# Patient Record
Sex: MALE | Race: ASIAN | NOT HISPANIC OR LATINO | URBAN - METROPOLITAN AREA
[De-identification: names, ages, dates, MRNs, and addresses within clinical notes are randomized per-mention and may not be internally consistent; named-entity substitution may affect disease eponyms.]

---

## 2019-01-09 ENCOUNTER — OFFICE VISIT (OUTPATIENT)
Dept: OPHTHALMOLOGY | Facility: CLINIC | Age: 64
End: 2019-01-09
Attending: OPHTHALMOLOGY
Payer: COMMERCIAL

## 2019-01-09 DIAGNOSIS — H35.373 EPIRETINAL MEMBRANE (ERM), BILATERAL: ICD-10-CM

## 2019-01-09 DIAGNOSIS — H35.373 EPIRETINAL MEMBRANE (ERM), BILATERAL: Primary | ICD-10-CM

## 2019-01-09 PROCEDURE — 92134 CPTRZ OPH DX IMG PST SGM RTA: CPT | Mod: ZF | Performed by: OPHTHALMOLOGY

## 2019-01-09 PROCEDURE — G0463 HOSPITAL OUTPT CLINIC VISIT: HCPCS | Mod: ZF

## 2019-01-09 ASSESSMENT — SLIT LAMP EXAM - LIDS
COMMENTS: NORMAL
COMMENTS: NORMAL

## 2019-01-09 ASSESSMENT — CONF VISUAL FIELD
OS_NORMAL: 1
METHOD: COUNTING FINGERS
OD_NORMAL: 1

## 2019-01-09 ASSESSMENT — VISUAL ACUITY
CORRECTION_TYPE: GLASSES
OS_PH_CC: 20/40
OS_CC: 20/60
OS_PH_CC+: +2
OD_PH_CC+: -2
METHOD: SNELLEN - LINEAR
OD_CC+: -1
OD_CC: 20/40
OD_PH_CC: 20/30

## 2019-01-09 ASSESSMENT — REFRACTION_WEARINGRX
OS_ADD: +2.00
OD_ADD: +2.00
OS_CYLINDER: +1.25
OS_SPHERE: -7.00
OD_CYLINDER: +1.00
OD_SPHERE: -8.00
OS_AXIS: 056
OD_AXIS: 120

## 2019-01-09 ASSESSMENT — TONOMETRY
IOP_METHOD: TONOPEN
OD_IOP_MMHG: 13
OS_IOP_MMHG: 14

## 2019-01-09 NOTE — NURSING NOTE
Chief Complaints and History of Present Illnesses   Patient presents with     Follow Up     3 year follow up Retinal tear left eye s/p cryotherapy     Chief Complaint(s) and History of Present Illness(es)     Follow Up     Associated symptoms: Negative for redness, dryness and tearing    Comments: 3 year follow up Retinal tear left eye s/p cryotherapy              Comments     Pt states vision more blurry in both eyes over the past 6 months. Pt's JULIUS was yesteday.  Pt notes that he was prescribed new glasses, but the prescription was about the same as his current glasses.  Pt has many questions about cataract surgery.   No eye pain today. Occasional floaters in BE, no changes.    Shraddha BA January 9, 2019 1:29 PM

## 2019-01-09 NOTE — PROGRESS NOTES
Cc: Renato Hollingsworth is a 63-year-old man who is here today s/p cryotherapy for retinal tear left eye.   HPI: He has had no change in vision. Denies flashes/floaters/field defects.      Past ocular history:   high myopia  Epiretinal membrane   Retinal tear left eye s/p cryotherapy       Impression  1. Retinal tear left eye s/p cryotherapy 10/2014   Doing well   Reviewed Retinal detachment precautions    2. High myopia  -- stable       3. Epiretinal membrane both eyes  -- stable  --Amsler precautions    4. Mild cataracts- observe    Follow up 8 months with OCT       Complete documentation of historical and exam elements from today's encounter can be found in the full encounter summary report (not reduplicated in this progress note).  I personally obtained the chief complaint(s) and history of present illness.  I confirmed and edited as necessary the review of systems, past medical/surgical history, family history, social history, and examination findings as documented by others; and I examined the patient myself.  I personally reviewed the relevant tests, images, and reports as documented above.  I formulated and edited as necessary the assessment and plan and discussed the findings and management plan with the patient and family       Gilles Barrow MD PhD  Vitreoretinal Surgery Fellow  [facility]

## 2019-09-10 DIAGNOSIS — H35.373 EPIRETINAL MEMBRANE (ERM), BILATERAL: Primary | ICD-10-CM

## 2019-11-08 ENCOUNTER — HEALTH MAINTENANCE LETTER (OUTPATIENT)
Age: 64
End: 2019-11-08

## 2020-02-23 ENCOUNTER — HEALTH MAINTENANCE LETTER (OUTPATIENT)
Age: 65
End: 2020-02-23

## 2020-12-06 ENCOUNTER — HEALTH MAINTENANCE LETTER (OUTPATIENT)
Age: 65
End: 2020-12-06

## 2021-04-11 ENCOUNTER — HEALTH MAINTENANCE LETTER (OUTPATIENT)
Age: 66
End: 2021-04-11

## 2021-09-25 ENCOUNTER — HEALTH MAINTENANCE LETTER (OUTPATIENT)
Age: 66
End: 2021-09-25

## 2021-10-13 ENCOUNTER — APPOINTMENT (OUTPATIENT)
Dept: URBAN - METROPOLITAN AREA CLINIC 259 | Age: 66
Setting detail: DERMATOLOGY
End: 2021-10-13

## 2021-10-13 DIAGNOSIS — D22 MELANOCYTIC NEVI: ICD-10-CM

## 2021-10-13 DIAGNOSIS — L57.8 OTHER SKIN CHANGES DUE TO CHRONIC EXPOSURE TO NONIONIZING RADIATION: ICD-10-CM

## 2021-10-13 DIAGNOSIS — L82.1 OTHER SEBORRHEIC KERATOSIS: ICD-10-CM

## 2021-10-13 PROBLEM — D48.5 NEOPLASM OF UNCERTAIN BEHAVIOR OF SKIN: Status: ACTIVE | Noted: 2021-10-13

## 2021-10-13 PROCEDURE — OTHER PATHOLOGY BILLING: OTHER

## 2021-10-13 PROCEDURE — OTHER REASSURANCE: OTHER

## 2021-10-13 PROCEDURE — OTHER COUNSELING: OTHER

## 2021-10-13 PROCEDURE — 88305 TISSUE EXAM BY PATHOLOGIST: CPT

## 2021-10-13 PROCEDURE — OTHER SHAVE REMOVAL: OTHER

## 2021-10-13 PROCEDURE — 99203 OFFICE O/P NEW LOW 30 MIN: CPT | Mod: 25

## 2021-10-13 PROCEDURE — 11311 SHAVE SKIN LESION 0.6-1.0 CM: CPT

## 2021-10-13 PROCEDURE — OTHER MIPS QUALITY: OTHER

## 2021-10-13 ASSESSMENT — LOCATION SIMPLE DESCRIPTION DERM
LOCATION SIMPLE: RIGHT LOWER BACK
LOCATION SIMPLE: RIGHT CHEEK
LOCATION SIMPLE: LEFT FOREHEAD
LOCATION SIMPLE: RIGHT THIGH

## 2021-10-13 ASSESSMENT — LOCATION DETAILED DESCRIPTION DERM
LOCATION DETAILED: LEFT SUPERIOR MEDIAL FOREHEAD
LOCATION DETAILED: RIGHT CENTRAL MALAR CHEEK
LOCATION DETAILED: RIGHT ANTERIOR PROXIMAL THIGH
LOCATION DETAILED: RIGHT SUPERIOR LATERAL MIDBACK

## 2021-10-13 ASSESSMENT — LOCATION ZONE DERM
LOCATION ZONE: TRUNK
LOCATION ZONE: FACE
LOCATION ZONE: FACE
LOCATION ZONE: LEG

## 2021-10-13 NOTE — PROCEDURE: MIPS QUALITY
Detail Level: Detailed
Quality 111:Pneumonia Vaccination Status For Older Adults: Pneumococcal Vaccination not Administered or Previously Received, Reason not Otherwise Specified
Quality 226: Preventive Care And Screening: Tobacco Use: Screening And Cessation Intervention: Patient screened for tobacco use and is an ex/non-smoker
Quality 130: Documentation Of Current Medications In The Medical Record: Current Medications Documented
Quality 431: Preventive Care And Screening: Unhealthy Alcohol Use - Screening: Patient screened for unhealthy alcohol use using a single question and scores less than 2 times per year
Quality 110: Preventive Care And Screening: Influenza Immunization: Influenza Immunization Ordered or Recommended, but not Administered due to system reason

## 2021-10-13 NOTE — PROCEDURE: SHAVE REMOVAL
Billing Type: Third-Party Bill
Anesthesia Type: 1% lidocaine with epinephrine
Medical Necessity Clause: This procedure was medically necessary because the lesion that was treated was:
Consent was obtained from the patient. The risks and benefits to therapy were discussed in detail. Specifically, the risks of infection, scarring, bleeding, prolonged wound healing, incomplete removal, allergy to anesthesia, nerve injury and recurrence were addressed. Prior to the procedure, the treatment site was clearly identified and confirmed by the patient. All components of Universal Protocol/PAUSE Rule completed.
Bill 03231 For Specimen Handling/Conveyance To Laboratory?: no
Was A Bandage Applied: Yes
Biopsy Method: Dermablade
Hemostasis: Drysol
X Size Of Lesion In Cm (Optional): 0
Detail Level: Detailed
Post-Care Instructions: I reviewed with the patient in detail post-care instructions. Patient is to keep the biopsy site dry overnight, and then apply bacitracin twice daily until healed. Patient may apply hydrogen peroxide soaks to remove any crusting.
Notification Instructions: Patient will be notified of pathology results. However, patient instructed to call the office if not contacted within 2 weeks.
Medical Necessity Information: It is in your best interest to select a reason for this procedure from the list below. All of these items fulfill various CMS LCD requirements except the new and changing color options.
Size Of Lesion In Cm (Required): 1
Wound Care: Petrolatum

## 2021-10-13 NOTE — PROCEDURE: PATHOLOGY BILLING
Immunohistochemistry (98993 and [id number]) billing is not performed here. Please use the Immunohistochemistry Stain Billing plan to accomplish this. Immunohistochemistry ([id number] and [id number]) billing is not performed here. Please use the Immunohistochemistry Stain Billing plan to accomplish this.

## 2022-05-07 ENCOUNTER — HEALTH MAINTENANCE LETTER (OUTPATIENT)
Age: 67
End: 2022-05-07

## 2023-01-07 ENCOUNTER — HEALTH MAINTENANCE LETTER (OUTPATIENT)
Age: 68
End: 2023-01-07

## 2023-05-16 DIAGNOSIS — H35.373 EPIRETINAL MEMBRANE (ERM) OF BOTH EYES: Primary | ICD-10-CM

## 2023-06-01 ENCOUNTER — OFFICE VISIT (OUTPATIENT)
Dept: OPHTHALMOLOGY | Facility: CLINIC | Age: 68
End: 2023-06-01
Attending: OPHTHALMOLOGY
Payer: COMMERCIAL

## 2023-06-01 DIAGNOSIS — H25.013 CORTICAL SENILE CATARACT OF BOTH EYES: ICD-10-CM

## 2023-06-01 DIAGNOSIS — H35.373 EPIRETINAL MEMBRANE (ERM) OF BOTH EYES: Primary | ICD-10-CM

## 2023-06-01 PROBLEM — B02.9 HERPES ZOSTER WITHOUT COMPLICATION: Status: ACTIVE | Noted: 2020-09-01

## 2023-06-01 PROCEDURE — 99214 OFFICE O/P EST MOD 30 MIN: CPT | Mod: 25 | Performed by: OPHTHALMOLOGY

## 2023-06-01 PROCEDURE — 92015 DETERMINE REFRACTIVE STATE: CPT

## 2023-06-01 PROCEDURE — 99204 OFFICE O/P NEW MOD 45 MIN: CPT | Mod: GC | Performed by: OPHTHALMOLOGY

## 2023-06-01 PROCEDURE — 92134 CPTRZ OPH DX IMG PST SGM RTA: CPT | Performed by: OPHTHALMOLOGY

## 2023-06-01 ASSESSMENT — SLIT LAMP EXAM - LIDS
COMMENTS: NORMAL
COMMENTS: NORMAL

## 2023-06-01 ASSESSMENT — VISUAL ACUITY
METHOD: SNELLEN - LINEAR
CORRECTION_TYPE: GLASSES
OD_PH_CC: 20/50
OS_PH_CC: 20/60
OD_CC+: +1
OS_CC: 20/300
OD_CC: 20/100

## 2023-06-01 ASSESSMENT — TONOMETRY
IOP_METHOD: TONOPEN
OS_IOP_MMHG: 21
OD_IOP_MMHG: 23

## 2023-06-01 ASSESSMENT — REFRACTION_MANIFEST
OS_AXIS: 040
OD_CYLINDER: +1.00
OD_SPHERE: -11.00
OS_SPHERE: -10.75
OS_CYLINDER: +1.25
OS_ADD: +2.75
OD_ADD: +2.75
OD_AXIS: 120

## 2023-06-01 ASSESSMENT — CONF VISUAL FIELD
OS_SUPERIOR_NASAL_RESTRICTION: 0
OS_NORMAL: 1
OD_SUPERIOR_TEMPORAL_RESTRICTION: 0
OS_INFERIOR_NASAL_RESTRICTION: 0
OD_SUPERIOR_NASAL_RESTRICTION: 0
OD_INFERIOR_TEMPORAL_RESTRICTION: 0
OD_NORMAL: 1
OS_INFERIOR_TEMPORAL_RESTRICTION: 0
OS_SUPERIOR_TEMPORAL_RESTRICTION: 0
OD_INFERIOR_NASAL_RESTRICTION: 0
METHOD: COUNTING FINGERS

## 2023-06-01 ASSESSMENT — CUP TO DISC RATIO
OS_RATIO: 0.4
OD_RATIO: 0.4

## 2023-06-01 ASSESSMENT — REFRACTION_WEARINGRX
OS_CYLINDER: +1.25
OD_SPHERE: -7.75
OS_ADD: +2.50
OS_AXIS: 068
OS_SPHERE: -7.50
OD_CYLINDER: +0.75
OD_ADD: +2.50
OD_AXIS: 120

## 2023-06-01 NOTE — NURSING NOTE
Chief Complaints and History of Present Illnesses   Patient presents with     Retinal Evaluation     Chief Complaint(s) and History of Present Illness(es)     Retinal Evaluation            Laterality: both eyes    Onset: years ago    Quality: Blurry computer   Eyes get tired after working on the computer    Associated symptoms: double vision (triple va more when driving.  Closing one eye does not help), photophobia and floaters.  Negative for dryness, redness, tearing and flashes    Treatments tried: no treatments    Pain scale: 0/10          Comments    Here for Epiretinal membrane (ERM), bilateral  Maddy Adhikari COT 7:21 AM June 1, 2023

## 2023-06-01 NOTE — PROGRESS NOTES
CC: Follow-up ERM OU   last eye examination 2015  Interval history: Last visit 1/9/19. Eyes get tired after working on computer. He has diplopia intermittently, including triple vision while driving, closing one eye does not resolve symptoms, they occur in each eye individually. No flashes. Chronic floaters. No eye pain. No eyedrops at home. He notices some metamorphopsia only when viewing with only left eye open.     HPI: Renato Hollingsworth is a 68 year old year-old patient with history of cryotherapy for retinal tear left eye, high myopia, and ERM both eyes.       Past ocular history:   high myopia OU  Epiretinal membrane OU  Retinal tear left eye s/p cryotherapy 10/2014    Retinal Imaging:   OCT Mac June 1, 2023  RE: ERM with disruption of foveal contour, no IRF/SRF, overall stable compared to 2019.   LE: ERM with disruption of foveal contour, no IRF/SRF, evolution from 2019 with slightly more distortion of fovea.      Assessment & Plan:       # Epiretinal membrane both eyes  -- Significant distortion of foveal contour in both eyes. Patient reporting increased monocular double and triple vision. He notes left eye metamorphopsia that resolves in binocular conditions.   -- His symptoms could also be caused by dry eyes and/or cataracts contributing.   -- Discuss possibility of dry eye treatment and cataract surgery first, followed by possibility ERM peel if symptoms persist. Vs considering combo surgery        # Retinal tear left eye s/p cryotherapy 10/2014               Doing well               Review Retinal detachment precautions     # High myopia  -- Myopic shift since last exam likely due to cataract progression.     # Cataracts  -- Likely becoming visually significant in both eyes. Cataract surgery would have the added benefit of lowering IOP.   - Discuss possibility of cataract surgery both eyes, discussing that this may not correct the double/triple vision and metamorphopsia.     # Pterygium, right eye  -  Monitor.  - Artificial tears both eyes.   - Discuss sun protection/sunglasses.     # Ocular hypertension, each eye  - IOP at max value of 23/21 today.   - Not on glaucoma drops.   - No family history of glaucoma.   - Consider cataract surgery as above.   - Consider glaucoma testing if persistent at future visits.     Refractive error  Final Rx given 06/01/23      Sphere Cylinder [place] Dist VA Add Near VA   Right -11.00 +1.00 120 20/40 +2.75 J1   Left -10.75 +1.25 040 20/30 +2.75 J1          PLAN:   Prescription given 06/01/23   Patient will consider Cataract extraction intraocular lens and possibly Epiretinal membrane peel in the next few months.  Follow up in 6 months with pachmetry; retinal nerve fiber layer; Optical Coherence Tomography of the macula  BAT and possible intraocular lens calcs  Karlos Brown MD  Ophthalmology, PGY-3      ~~~~~~~~~~~~~~~~~~~~~~~~~~~~~~~~~~   Complete documentation of historical and exam elements from today's encounter can be found in the full encounter summary report (not reduplicated in this progress note).  I personally obtained the chief complaint(s) and history of present illness.  I confirmed and edited as necessary the review of systems, past medical/surgical history, family history, social history, and examination findings as documented by others; and I examined the patient myself.  I personally reviewed the relevant tests, images, and reports as documented above.  I personally reviewed the ophthalmic test(s) associated with this encounter, agree with the interpretation(s) as documented by the resident/fellow, and have edited the corresponding report(s) as necessary.   I formulated and edited as necessary the assessment and plan and discussed the findings and management plan with the patient and family    Marcella Martínez MD  Professor of Ophthalmology.   Vitreo-retinal surgeon   Department of Ophthalmology and Visual Neurosciences   [facility]  Phone: [phone]   Fax: [phone]

## 2023-06-02 ENCOUNTER — HEALTH MAINTENANCE LETTER (OUTPATIENT)
Age: 68
End: 2023-06-02

## 2023-06-14 ENCOUNTER — IMMUNIZATION (OUTPATIENT)
Dept: NURSING | Facility: CLINIC | Age: 68
End: 2023-06-14
Payer: COMMERCIAL

## 2023-06-14 PROCEDURE — 0124A COVID-19 BIVALENT 12+ (PFIZER): CPT

## 2023-06-14 PROCEDURE — 91312 COVID-19 BIVALENT 12+ (PFIZER): CPT

## 2023-11-03 ENCOUNTER — IMMUNIZATION (OUTPATIENT)
Dept: NURSING | Facility: CLINIC | Age: 68
End: 2023-11-03
Payer: COMMERCIAL

## 2023-11-03 PROCEDURE — 90480 ADMN SARSCOV2 VAC 1/ONLY CMP: CPT

## 2023-11-03 PROCEDURE — 91320 SARSCV2 VAC 30MCG TRS-SUC IM: CPT

## 2023-12-08 DIAGNOSIS — H35.373 EPIRETINAL MEMBRANE (ERM) OF BOTH EYES: ICD-10-CM

## 2023-12-08 DIAGNOSIS — H40.053 OCULAR HYPERTENSION, BILATERAL: Primary | ICD-10-CM

## 2023-12-20 ENCOUNTER — OFFICE VISIT (OUTPATIENT)
Dept: OPHTHALMOLOGY | Facility: CLINIC | Age: 68
End: 2023-12-20
Attending: OPHTHALMOLOGY
Payer: COMMERCIAL

## 2023-12-20 DIAGNOSIS — H35.373 EPIRETINAL MEMBRANE (ERM) OF BOTH EYES: ICD-10-CM

## 2023-12-20 DIAGNOSIS — H40.053 OCULAR HYPERTENSION, BILATERAL: ICD-10-CM

## 2023-12-20 PROCEDURE — 92134 CPTRZ OPH DX IMG PST SGM RTA: CPT | Performed by: OPHTHALMOLOGY

## 2023-12-20 PROCEDURE — 99214 OFFICE O/P EST MOD 30 MIN: CPT | Performed by: OPHTHALMOLOGY

## 2023-12-20 PROCEDURE — 99207 OCT OPTIC NERVE RNFL SPECTRALIS OU (BOTH EYES): CPT | Mod: 26 | Performed by: OPHTHALMOLOGY

## 2023-12-20 PROCEDURE — 99212 OFFICE O/P EST SF 10 MIN: CPT | Performed by: OPHTHALMOLOGY

## 2023-12-20 PROCEDURE — 92133 CPTRZD OPH DX IMG PST SGM ON: CPT | Performed by: OPHTHALMOLOGY

## 2023-12-20 ASSESSMENT — PACHYMETRY
OD_CT(UM): 564
OS_CT(UM): 581

## 2023-12-20 ASSESSMENT — REFRACTION_WEARINGRX
OD_SPHERE: -11.00
OS_AXIS: 040
OD_AXIS: 120
OD_CYLINDER: +1.00
OD_ADD: +2.75
OS_SPHERE: -10.75
OS_CYLINDER: +1.25
OS_ADD: +2.75

## 2023-12-20 ASSESSMENT — CONF VISUAL FIELD
OD_SUPERIOR_NASAL_RESTRICTION: 0
OD_NORMAL: 1
OD_INFERIOR_NASAL_RESTRICTION: 0
OS_SUPERIOR_TEMPORAL_RESTRICTION: 0
OS_INFERIOR_NASAL_RESTRICTION: 0
OD_INFERIOR_TEMPORAL_RESTRICTION: 0
OD_SUPERIOR_TEMPORAL_RESTRICTION: 0
OS_NORMAL: 1
OS_INFERIOR_TEMPORAL_RESTRICTION: 0
OS_SUPERIOR_NASAL_RESTRICTION: 0

## 2023-12-20 ASSESSMENT — VISUAL ACUITY
METHOD: SNELLEN - LINEAR
CORRECTION_TYPE: GLASSES
OS_BAT_MED: 20/60
OD_BAT_HIGH: 20/125
OS_BAT_LOW: 20/60
OS_BAT_HIGH: 20/125
OD_CC: 20/60
OD_BAT_MED: 20/60
OS_CC: 20/60
OD_BAT_LOW: 20/60

## 2023-12-20 ASSESSMENT — TONOMETRY
OS_IOP_MMHG: 18
OD_IOP_MMHG: 14
IOP_METHOD: TONOPEN

## 2023-12-20 ASSESSMENT — CUP TO DISC RATIO
OS_RATIO: 0.4
OD_RATIO: 0.4

## 2023-12-20 ASSESSMENT — SLIT LAMP EXAM - LIDS
COMMENTS: NORMAL
COMMENTS: NORMAL

## 2023-12-20 NOTE — PROGRESS NOTES
CC: Follow-up ERM OU   last eye examination 2015  Interval history: reports the prescription given helped, but now is getting blurry again.  Eyes get tired after working on computer. He has diplopia intermittently, including triple vision while driving, closing one eye does not resolve symptoms, they occur in each eye individually. No flashes. Chronic floaters. No eye pain. No eyedrops at home. He notices some metamorphopsia only when viewing with only left eye open.     HPI: Renato Hollingsworth is a 68 year old year-old patient with history of cryotherapy for retinal tear left eye, high myopia, and ERM both eyes.       Past ocular history:   high myopia OU  Epiretinal membrane OU  Retinal tear left eye s/p cryotherapy 10/2014    Retinal Imaging:   OCT Mac 12/20/23   RE: ERM with disruption of foveal contour, no IRF/SRF, overall stable  LE: ERM with disruption of foveal contour, no IRF/SRF, evolution from 2019 with slightly more distortion of fovea.      Assessment & Plan:    # Epiretinal membrane both eyes  -- Significant distortion of foveal contour in both eyes. Patient reporting increased monocular double and triple vision. He notes left eye metamorphopsia that resolves in binocular conditions.   -- His symptoms could also be caused by dry eyes and/or cataracts contributing.   -- Discuss possibility of dry eye treatment and cataract surgery first, followed by possibility ERM peel if symptoms persist. Vs considering combo surgery      # Retinal tear left eye s/p cryotherapy 10/2014               Doing well               Review Retinal detachment precautions     # Cataracts  -- visually significant in both eyes. Cataract surgery would have the added benefit of lowering IOP.   - Discuss possibility of cataract surgery both eyes, discussing that this may not correct the double/triple vision and metamorphopsia.   # High myopia  -- Myopic shift since last exam likely due to cataract progression.   Glare Testing     Off Low  Medium High   Right 20/60 20/60 20/60 20/125   Left 20/60 20/60 20/60 20/125   Edited by: Cait Owens          Plan for Cataract extraction intraocular lens left eye first  Patient high myopia  Has pterygium right eye     The cataract is visually significant, Reports impacts ADL and has glare     Surgeon procedure time:  45 min  Urgency of Surgery: Routine  Post-op apps needed: 1day; 1 week; 3 weeks  Multi surgeon case: No   H&P completed by primary care physician/PAC   needed: NO  Anesthesia: Peribulbar block    Aim for: -2.0    Dilation:Good  Iris expansion: Not needed  Epinephrine: No  Pseudoexfoliation: NO  Trypan Blue: Poss  Phacodonesis: No  Cornea guttae: rare  Anticoagulants: NO  Flomax: NO  Candidate for multifocal or toric IOL: NO  Visually significant astigmatism: Discussed elective surgical refractive corrective options  Prior refractive surgery: No    I reviewed the indications, risks, benefits, and alternatives of the proposed surgical procedure. We discussed at length cataracts and the effect of the cataracts on vision.   We discussed the fact that modern cataract surgery is usually successful at alleviating symptoms of glare when the cataract is the causative factor. Other risks were discussed with patient including, but not limited to, failure to improve vision or further loss of vision, bleeding, infection, loss of vision and the remote possibility of complications of anesthesia or need for additional surgery. 1:1000 risk of infection/bleed/loss of eye; 1:100 risk of RD and need for further surgery.  Patient agreed to proceed with surgery.  I provided multiple opportunities for the questions, answered all questions to the best of my ability, and confirmed that my answers and my discussion were understood.     Patient not sure when he wants to surgery will return in 4 months      # Pterygium, right eye  - Monitor.  - Artificial tears both eyes.   - Discuss sun protection/sunglasses.      # history of Ocular hypertension, each eye  Pachmetry 563/ 587  - IOP at max value of 23/21 12/20/23 14/18  - Not on glaucoma drops.   - No family history of glaucoma.   - Consider cataract surgery as above.   - retinal nerve fiber layer 12/20/23 nasal thinning both eyes, but optic nerve tilted  Will need OVF baseline    Refractive error  Prescription given 06/01/23     Sphere Cylinder [place] Dist VA Add Near VA   Right -11.00 +1.00 120 20/40 +2.75 J1   Left -10.75 +1.25 040 20/30 +2.75 J1          PLAN:  follow up in 4 months with prescription; intraocular lens calcs; topography      ~~~~~~~~~~~~~~~~~~~~~~~~~~~~~~~~~~   Complete documentation of historical and exam elements from today's encounter can be found in the full encounter summary report (not reduplicated in this progress note).  I personally obtained the chief complaint(s) and history of present illness.  I confirmed and edited as necessary the review of systems, past medical/surgical history, family history, social history, and examination findings as documented by others; and I examined the patient myself.  I personally reviewed the relevant tests, images, and reports as documented above.  I personally reviewed the ophthalmic test(s) associated with this encounter, agree with the interpretation(s) as documented by the resident/fellow, and have edited the corresponding report(s) as necessary.   I formulated and edited as necessary the assessment and plan and discussed the findings and management plan with the patient and family    Marcella Martínez MD  Professor of Ophthalmology.   Vitreo-retinal surgeon   Department of Ophthalmology and Visual Neurosciences   [facility]  Phone: [phone]   Fax: [phone]

## 2023-12-20 NOTE — NURSING NOTE
Chief Complaints and History of Present Illnesses   Patient presents with    Follow Up     6 month follow up  Vision has changed last couple months  Cait MAY 8:41 AM December 20, 2023        Chief Complaint(s) and History of Present Illness(es)       Follow Up              Laterality: both eyes    Associated symptoms: floaters.  Negative for eye pain, pain with eye movement and flashes    Treatments tried: no treatments    Comments: 6 month follow up  Vision has changed last couple months  Cait MAY 8:41 AM December 20, 2023

## 2024-04-09 DIAGNOSIS — H25.013 CORTICAL SENILE CATARACT OF BOTH EYES: Primary | ICD-10-CM

## 2024-04-24 ENCOUNTER — OFFICE VISIT (OUTPATIENT)
Dept: OPHTHALMOLOGY | Facility: CLINIC | Age: 69
End: 2024-04-24
Attending: OPHTHALMOLOGY
Payer: COMMERCIAL

## 2024-04-24 DIAGNOSIS — H35.373 EPIRETINAL MEMBRANE (ERM) OF BOTH EYES: Primary | ICD-10-CM

## 2024-04-24 PROCEDURE — 92134 CPTRZ OPH DX IMG PST SGM RTA: CPT | Performed by: OPHTHALMOLOGY

## 2024-04-24 PROCEDURE — 99214 OFFICE O/P EST MOD 30 MIN: CPT | Performed by: OPHTHALMOLOGY

## 2024-04-24 PROCEDURE — 99212 OFFICE O/P EST SF 10 MIN: CPT | Performed by: OPHTHALMOLOGY

## 2024-04-24 ASSESSMENT — REFRACTION_WEARINGRX
OS_CYLINDER: +1.25
OS_ADD: +2.75
OS_SPHERE: -10.75
OD_AXIS: 120
OD_ADD: +2.75
OD_SPHERE: -11.00
OS_AXIS: 040
OD_CYLINDER: +1.00

## 2024-04-24 ASSESSMENT — VISUAL ACUITY
METHOD: SNELLEN - LINEAR
OD_CC: 20/60
OD_CC+: -1
OS_CC+: -1
CORRECTION_TYPE: GLASSES
OS_CC: 20/50

## 2024-04-24 ASSESSMENT — SLIT LAMP EXAM - LIDS
COMMENTS: NORMAL
COMMENTS: NORMAL

## 2024-04-24 ASSESSMENT — TONOMETRY
IOP_METHOD: TONOPEN
OD_IOP_MMHG: 14
OS_IOP_MMHG: 13

## 2024-04-24 ASSESSMENT — CUP TO DISC RATIO
OD_RATIO: 0.4
OS_RATIO: 0.4

## 2024-04-24 NOTE — NURSING NOTE
Chief Complaints and History of Present Illnesses   Patient presents with    Cataract Follow-Up     Chief Complaint(s) and History of Present Illness(es)       Cataract Follow-Up              Laterality: both eyes    Duration: 4 months              Comments    Pt. States that that there has been no change in VA BE. No pain BE, some dryness BE. No flashes or floaters BE. Pt. Isn't ready for surgery at this time. Would consider at the end of the year.     Jackelyn CORTES 8:22 AM April 24, 2024

## 2024-04-24 NOTE — PROGRESS NOTES
CC: Follow-up ERM OU   last eye examination 12.2023  Interval history:  He has chronic monocular diplopia intermittently left eye worse than right eye, including triple vision while driving, glare. closing one eye does not resolve symptoms, they occur in each eye individually. No flashes. Chronic floaters. No eye pain. No eyedrops at home.   He notices some metamorphopsia only when viewing with only left eye open.     HPI: Renato Hollingsworth is a 69 year old year-old patient with history of cryotherapy for retinal tear left eye, high myopia, and ERM both eyes.       Past ocular history:   high myopia OU  Epiretinal membrane OU  Retinal tear left eye s/p cryotherapy 10/2014    Retinal Imaging:   OCT Mac 04/24/24   RE: ERM with disruption of foveal contour, no IRF/SRF, overall stable  LE: ERM with disruption of foveal contour, no IRF/SRF, evolution from 2019 with slightly more distortion of fovea.      Assessment & Plan:    # Epiretinal membrane both eyes  -- Significant distortion of foveal contour in both eyes. Patient reporting increased monocular double and triple vision. He notes left eye metamorphopsia that resolves in binocular conditions.   -- His symptoms could also be caused by dry eyes and/or cataracts contributing.   -- Discuss possibility of dry eye treatment and cataract surgery first, followed by possibility ERM peel if symptoms persist. Vs considering combo surgery      # Retinal tear left eye s/p cryotherapy 10/2014               Doing well               Review Retinal detachment precautions     # Cataracts  -- visually significant in both eyes. Cataract surgery would have the added benefit of lowering IOP.   - Discuss possibility of cataract surgery both eyes, discussing that this may not correct the double/triple vision and metamorphopsia.   # High myopia  -- Myopic shift since last exam likely due to cataract progression.   Glare Testing     Off Low Medium High   Right 20/60 20/60 20/60 20/125   Left  20/60 20/60 20/60 20/125   Edited by: Cait Owens          Plan for Cataract extraction intraocular lens left eye first  Patient high myopia  Has pterygium right eye     The cataract is visually significant, Reports impacts ADL and has glare     Surgeon procedure time:  45 min  Urgency of Surgery: Routine  Post-op apps needed: 1day; 1 week; 3 weeks  Multi surgeon case: No   H&P completed by primary care physician/PAC   needed: NO  Anesthesia: Peribulbar block    Aim for: -2.0    Dilation:Good  Iris expansion: Not needed  Epinephrine: No  Pseudoexfoliation: NO  Trypan Blue: Poss  Phacodonesis: No  Cornea guttae: rare  Anticoagulants: NO  Flomax: NO  Candidate for multifocal or toric IOL: NO  Visually significant astigmatism: Discussed elective surgical refractive corrective options  Prior refractive surgery: No    I reviewed the indications, risks, benefits, and alternatives of the proposed surgical procedure. We discussed at length cataracts and the effect of the cataracts on vision.   We discussed the fact that modern cataract surgery is usually successful at alleviating symptoms of glare when the cataract is the causative factor. Other risks were discussed with patient including, but not limited to, failure to improve vision or further loss of vision, bleeding, infection, loss of vision and the remote possibility of complications of anesthesia or need for additional surgery. 1:1000 risk of infection/bleed/loss of eye; 1:100 risk of RD and need for further surgery.  Patient agreed to proceed with surgery.  I provided multiple opportunities for the questions, answered all questions to the best of my ability, and confirmed that my answers and my discussion were understood.       # Pterygium, right eye  - Monitor.  - Artificial tears both eyes.   - Discuss sun protection/sunglasses.     # history of Ocular hypertension, each eye  Pachmetry 564/ 581  - IOP at max value of 23/21 04/24/24 14/13  - Not on  glaucoma drops.   - No family history of glaucoma.   - Consider cataract surgery as above.   - retinal nerve fiber layer 12/20/23 nasal thinning both eyes, but optic nerve tilted  Will need OVF baseline    Refractive error  Prescription given 06/01/23     Sphere Cylinder [place] Dist VA Add Near VA   Right -11.00 +1.00 120 20/40 +2.75 J1   Left -10.75 +1.25 040 20/30 +2.75 J1          PLAN:    wants surgery around October, 2024  with intraocular lens calcs; topography; Optical Coherence Tomography       ~~~~~~~~~~~~~~~~~~~~~~~~~~~~~~~~~~   Complete documentation of historical and exam elements from today's encounter can be found in the full encounter summary report (not reduplicated in this progress note).  I personally obtained the chief complaint(s) and history of present illness.  I confirmed and edited as necessary the review of systems, past medical/surgical history, family history, social history, and examination findings as documented by others; and I examined the patient myself.  I personally reviewed the relevant tests, images, and reports as documented above.  I formulated and edited as necessary the assessment and plan and discussed the findings and management plan with the patient and family    Marcella Martínez MD  Professor of Ophthalmology.   Vitreo-retinal surgeon   Department of Ophthalmology and Visual Neurosciences   [facility]  Phone: [phone]   Fax: [phone]

## 2024-06-29 ENCOUNTER — HEALTH MAINTENANCE LETTER (OUTPATIENT)
Age: 69
End: 2024-06-29

## 2024-10-09 DIAGNOSIS — H35.373 EPIRETINAL MEMBRANE (ERM) OF BOTH EYES: ICD-10-CM

## 2024-10-09 DIAGNOSIS — H25.013 CORTICAL SENILE CATARACT OF BOTH EYES: Primary | ICD-10-CM

## 2024-11-27 DIAGNOSIS — H35.373 EPIRETINAL MEMBRANE (ERM) OF BOTH EYES: Primary | ICD-10-CM

## 2025-06-18 ENCOUNTER — OFFICE VISIT (OUTPATIENT)
Dept: OPHTHALMOLOGY | Facility: CLINIC | Age: 70
End: 2025-06-18
Attending: OPHTHALMOLOGY
Payer: COMMERCIAL

## 2025-06-18 DIAGNOSIS — H35.373 EPIRETINAL MEMBRANE (ERM) OF BOTH EYES: ICD-10-CM

## 2025-06-18 DIAGNOSIS — H25.013 CORTICAL SENILE CATARACT OF BOTH EYES: ICD-10-CM

## 2025-06-18 PROCEDURE — 92134 CPTRZ OPH DX IMG PST SGM RTA: CPT | Performed by: OPHTHALMOLOGY

## 2025-06-18 PROCEDURE — 92025 CPTRIZED CORNEAL TOPOGRAPHY: CPT | Performed by: OPHTHALMOLOGY

## 2025-06-18 PROCEDURE — 99213 OFFICE O/P EST LOW 20 MIN: CPT | Performed by: OPHTHALMOLOGY

## 2025-06-18 PROCEDURE — 76519 ECHO EXAM OF EYE: CPT | Performed by: OPHTHALMOLOGY

## 2025-06-18 ASSESSMENT — CONF VISUAL FIELD
OS_INFERIOR_TEMPORAL_RESTRICTION: 0
OS_SUPERIOR_NASAL_RESTRICTION: 0
OD_INFERIOR_NASAL_RESTRICTION: 0
OS_SUPERIOR_TEMPORAL_RESTRICTION: 0
OS_NORMAL: 1
METHOD: COUNTING FINGERS
OS_INFERIOR_NASAL_RESTRICTION: 0
OD_INFERIOR_TEMPORAL_RESTRICTION: 0
OD_SUPERIOR_NASAL_RESTRICTION: 0
OD_NORMAL: 1
OD_SUPERIOR_TEMPORAL_RESTRICTION: 0

## 2025-06-18 ASSESSMENT — SLIT LAMP EXAM - LIDS
COMMENTS: NORMAL
COMMENTS: NORMAL

## 2025-06-18 ASSESSMENT — REFRACTION_WEARINGRX
OS_SPHERE: -10.75
OS_CYLINDER: +1.25
SPECS_TYPE: PAL
OD_CYLINDER: +1.00
OS_ADD: +2.75
OD_AXIS: 120
OD_ADD: +2.75
OS_AXIS: 040
OD_SPHERE: -11.00

## 2025-06-18 ASSESSMENT — VISUAL ACUITY
OD_PH_CC+: -/+1
OS_PH_CC: 20/80
OS_CC: 20/250
OD_CC: 20/200
OD_PH_CC: 20/50
METHOD: SNELLEN - LINEAR
CORRECTION_TYPE: GLASSES

## 2025-06-18 ASSESSMENT — REFRACTION_MANIFEST
OD_CYLINDER: +2.25
OS_AXIS: 161
OD_SPHERE: -12.00
OD_AXIS: 132
OS_SPHERE: -10.50
OS_CYLINDER: +1.25

## 2025-06-18 ASSESSMENT — CUP TO DISC RATIO
OD_RATIO: 0.4
OS_RATIO: 0.4

## 2025-06-18 ASSESSMENT — TONOMETRY
IOP_METHOD: ICARE
OS_IOP_MMHG: 12
OD_IOP_MMHG: 13

## 2025-06-18 NOTE — PROGRESS NOTES
CC: Follow-up ERM OU     Interval history:  complaining of distortion of the vision.   He has chronic monocular diplopia intermittently left eye worse than right eye, including triple vision while driving, glare. closing one eye does not resolve symptoms, they occur in each eye individually. No flashes. Chronic floaters. No eye pain. No eyedrops at home.   He notices some metamorphopsia only when viewing with only left eye open.     HPI: Renato Hollingsworth is a 70 year old year-old patient with history of cryotherapy for retinal tear left eye, high myopia, and ERM both eyes.     Past ocular history:   high myopia OU  Epiretinal membrane OU  Retinal tear left eye s/p cryotherapy 10/2014    Retinal Imaging:   OCT Mac 06/18/25   RE: ERM with disruption of foveal contour, no IRF/SRF, overall stable  LE: ERM with disruption of foveal contour, no IRF/SRF, evolution from 2019 with slightly more distortion of fovea.      Intraocular lens calculations reliable   AL right eye 28.3; left eye 28.25    Topography: astigmatism regular    Assessment & Plan:    # Epiretinal membrane both eyes  -- Significant distortion of foveal contour in both eyes.   - Patient reporting increased monocular double and triple vision. He notes left eye metamorphopsia that resolves in binocular conditions.   -- Discuss with patient cataract surgery first, followed by possibility ERM peel Vs considering combo surgery   After discussed with patient risks, benefits and alternatives the decision was to proceed with Cataract extraction intraocular lens first     # Retinal tear left eye s/p cryotherapy 10/2014               Doing well               Review Retinal detachment precautions     # Cataracts  -- visually significant in both eyes.   - Cataract surgery would have the added benefit of lowering IOP.   - Discuss possibility of cataract surgery both eyes, discussing that this may not correct the double/triple vision and metamorphopsia.     # High  myopia  -- Myopic shift since last exam likely due to cataract progression.   Glare Testing     Off Low Medium High   Right 20/60 20/60 20/60 20/125   Left 20/60 20/60 20/60 20/125   Edited by: Cait Owens        Manifest Refraction     Sphere Cylinder [place] Dist [facility]   Right -12.00 +2.25 132 20/50-   Left -10.50 +1.25 161 20/60-2/+2   Diagnostic refractions    Plan for Cataract extraction intraocular lens left eye first  Patient high myopia  Has pterygium right eye   Attending only case- per patient preferences and long axial lenght    The cataract is visually significant, Reports impacts ADL and has glare     Surgeon procedure time:  45 min  Urgency of Surgery: Routine  Post-op apps needed: 1day; 1 week; 3 weeks  Multi surgeon case: No   H&P completed by primary care physician/PAC   needed: NO  Anesthesia: Peribulbar block    Aim for: -2.0    Dilation:Good  Iris expansion: Not needed  Epinephrine: No  Pseudoexfoliation: NO  Trypan Blue: Available  Phacodonesis: No  Cornea guttae: rare  Anticoagulants: NO  Flomax: NO  Candidate for multifocal or toric IOL: NO  Visually significant astigmatism: Discussed elective surgical refractive corrective options. Prefers intraocular lens covered by insurance. He also has Epiretinal membrane that could require additional surgeries  Prior refractive surgery: No    Anesthesia: modified peribulbar block and sedation    I reviewed the indications, risks, benefits, and alternatives of the proposed surgical procedure. We discussed at length cataracts and the effect of the cataracts on vision.   We discussed the fact that modern cataract surgery is usually successful at alleviating symptoms of glare when the cataract is the causative factor. Other risks were discussed with patient including, but not limited to, failure to improve vision or further loss of vision, bleeding, infection, loss of vision and the remote possibility of complications of anesthesia or need for  additional surgery. 1:1000 risk of infection/bleed/loss of eye; 1:100 risk of RD and need for further surgery.  Patient agreed to proceed with surgery.  I provided multiple opportunities for the questions, answered all questions to the best of my ability, and confirmed that my answers and my discussion were understood.     # Pterygium, right eye  - Monitor.  - Artificial tears both eyes.   - Discuss sun protection/sunglasses.     # history of Ocular hypertension, each eye  Pachmetry 564/ 581  - IOP at max value of 23/21 04/24/24 14/13  - Not on glaucoma drops.   - No family history of glaucoma.   - Consider cataract surgery as above.   - retinal nerve fiber layer 12/20/23 nasal thinning both eyes, but optic nerve tilted  Will need OVF baseline       PLAN: follow up POD1   If patient decides not to proceed with surgery will follow up in 6 months with Optical Coherence Tomography     ~~~~~~~~~~~~~~~~~~~~~~~~~~~~~~~~~~   Complete documentation of historical and exam elements from today's encounter can be found in the full encounter summary report (not reduplicated in this progress note).  I personally obtained the chief complaint(s) and history of present illness.  I confirmed and edited as necessary the review of systems, past medical/surgical history, family history, social history, and examination findings as documented by others; and I examined the patient myself.  I personally reviewed the relevant tests, images, and reports as documented above.  I formulated and edited as necessary the assessment and plan and discussed the findings and management plan with the patient and family    Marcella Martínez MD  Professor of Ophthalmology.   Vitreo-retinal surgeon   Department of Ophthalmology and Visual Neurosciences   [facility]  Phone: [phone]   Fax: [phone]

## 2025-06-19 ENCOUNTER — TELEPHONE (OUTPATIENT)
Dept: OPHTHALMOLOGY | Facility: CLINIC | Age: 70
End: 2025-06-19
Payer: COMMERCIAL

## 2025-06-19 NOTE — TELEPHONE ENCOUNTER
[facility] asking for a call back to [phone] to schedule surgery with Dr. Martínez. Erendira Shirley on 6/19/2025 at 2:01 PM

## 2025-07-12 ENCOUNTER — HEALTH MAINTENANCE LETTER (OUTPATIENT)
Age: 70
End: 2025-07-12